# Patient Record
Sex: FEMALE | Race: WHITE | Employment: PART TIME | URBAN - NONMETROPOLITAN AREA
[De-identification: names, ages, dates, MRNs, and addresses within clinical notes are randomized per-mention and may not be internally consistent; named-entity substitution may affect disease eponyms.]

---

## 2017-03-16 ENCOUNTER — OFFICE VISIT (OUTPATIENT)
Dept: CARDIOLOGY CLINIC | Age: 47
End: 2017-03-16

## 2017-03-16 VITALS
DIASTOLIC BLOOD PRESSURE: 70 MMHG | HEIGHT: 66 IN | WEIGHT: 145 LBS | SYSTOLIC BLOOD PRESSURE: 122 MMHG | BODY MASS INDEX: 23.3 KG/M2 | HEART RATE: 76 BPM

## 2017-03-16 DIAGNOSIS — R00.0 TACHYCARDIA: Primary | ICD-10-CM

## 2017-03-16 DIAGNOSIS — R07.89 CHEST PRESSURE: ICD-10-CM

## 2017-03-16 PROCEDURE — 99214 OFFICE O/P EST MOD 30 MIN: CPT | Performed by: INTERNAL MEDICINE

## 2017-03-16 RX ORDER — GABAPENTIN 100 MG/1
100 CAPSULE ORAL 3 TIMES DAILY
COMMUNITY

## 2017-03-16 RX ORDER — MECLIZINE HYDROCHLORIDE 25 MG/1
25 TABLET ORAL 3 TIMES DAILY PRN
COMMUNITY

## 2017-05-03 ENCOUNTER — TELEPHONE (OUTPATIENT)
Dept: CARDIOLOGY CLINIC | Age: 47
End: 2017-05-03

## 2017-10-17 ENCOUNTER — OFFICE VISIT (OUTPATIENT)
Dept: CARDIOLOGY CLINIC | Age: 47
End: 2017-10-17

## 2017-10-17 VITALS
OXYGEN SATURATION: 98 % | WEIGHT: 143 LBS | DIASTOLIC BLOOD PRESSURE: 80 MMHG | SYSTOLIC BLOOD PRESSURE: 140 MMHG | BODY MASS INDEX: 22.98 KG/M2 | HEIGHT: 66 IN | HEART RATE: 87 BPM

## 2017-10-17 DIAGNOSIS — F10.20 UNCOMPLICATED ALCOHOL DEPENDENCE (HCC): ICD-10-CM

## 2017-10-17 DIAGNOSIS — R07.89 CHEST PRESSURE: ICD-10-CM

## 2017-10-17 DIAGNOSIS — R00.0 TACHYCARDIA: Primary | ICD-10-CM

## 2017-10-17 PROCEDURE — 99213 OFFICE O/P EST LOW 20 MIN: CPT | Performed by: INTERNAL MEDICINE

## 2017-10-17 PROCEDURE — 93000 ELECTROCARDIOGRAM COMPLETE: CPT | Performed by: INTERNAL MEDICINE

## 2017-10-17 RX ORDER — OMEPRAZOLE 40 MG/1
40 CAPSULE, DELAYED RELEASE ORAL DAILY
COMMUNITY

## 2017-10-17 RX ORDER — NADOLOL 20 MG/1
10 TABLET ORAL DAILY PRN
Qty: 30 TABLET | Refills: 5 | Status: SHIPPED | OUTPATIENT
Start: 2017-10-17

## 2017-10-17 NOTE — PATIENT INSTRUCTIONS
We have discussed Emma finding someone to talk with and support her when she is stressed.   She will do a plain GXT to evaluate recent chest pain  No medication changes warranted  Follow up in 6 months with Dr Rowley Aw

## 2017-10-17 NOTE — PROGRESS NOTES
strength, numbness or tingling. No change in gait, balance, coordination, mood, affect, memory, mentation, behavior. · Psychiatric: No anxiety, no depression. +Stress   · Endocrine: No malaise, fatigue or temperature intolerance. No excessive thirst, fluid intake, or urination. No tremor. · Hematologic/Lymphatic: No abnormal bruising or bleeding, blood clots or swollen lymph nodes. · Allergic/Immunologic: No nasal congestion or hives. Physical Examination:    Vitals:    10/17/17 1252   BP: 88/60   Pulse: 87   SpO2: 98%        · GENERAL: Well developed, well nourished, No acute distress, appears younger than stated age. · NEUROLOGICAL: Alert and oriented  · PSYCH: Calm affect  · SKIN: Warm and dry  · HEENT: Normocephalic, Sclera non-icteric, mucus membranes moist  · NECK: supple, JVP normal  · CAROTID: Normal upstroke, no bruits. · CARDIAC: Normal PMI, regular rate and rhythm, normal S1S2, no murmur, rub, or gallop  · RESPIRATORY: Normal respiratory effort, clear to auscultation bilaterally  · EXTREMITIES: No edema. · MUSCULOSKELETAL: No joint swelling or tenderness, no chest wall tenderness  · GASTROINTESTINAL: normal bowel sounds, soft, non-tender, no bruit      Assessment:   Problem: Tachycardia  Persistent, for 4 years, holter monitor showed average HR 83 bpm.  Rare PVC's and PAC's. TSH has been normal  Continues using nadolol sparingly d/t it lowering her BP and causing fatigue.-Hasn't used any in two months    Problem: Chest pressure  In the last week, nonexertional.  Repeat GXT     Problem: Wellness  Lipids 1/2016:  , HDL 74, LDL 60, TG 73  CRP and ESR normal      Plan: We have discussed Emma finding someone to talk with and support her when she is stressed.   She will do a plain GXT to evaluate recent chest pain  No medication changes warranted  Follow up in 6 months with Dr Rowley Aw       Thank you for allowing me to participate in the care of this individual.      Shelbie Ball M.D., [facility], FSCAI

## 2017-11-15 ENCOUNTER — TELEPHONE (OUTPATIENT)
Dept: CARDIOLOGY CLINIC | Age: 47
End: 2017-11-15

## 2017-11-15 DIAGNOSIS — R94.31 ABNORMAL ECG DURING EXERCISE STRESS TEST: ICD-10-CM

## 2017-11-15 DIAGNOSIS — R07.89 CHEST PRESSURE: Primary | ICD-10-CM

## 2017-11-15 NOTE — TELEPHONE ENCOUNTER
Pt returned my call. Discussed the abnormal stress test and recommendation by [facility] to have myoview stress in [facility] location. Was given the Central Scheduling number to call to arrange.

## 2017-11-15 NOTE — TELEPHONE ENCOUNTER
Reviewed gxt results and EKG with [facility]. Is abnormal. Stress myoview recommended . lmor as above asking pt to return call. Pt needs to have a stress myoview in [address] . Order placed.  Needs to call Central Scheduling [phone]

## 2018-01-22 ENCOUNTER — HOSPITAL ENCOUNTER (OUTPATIENT)
Dept: NON INVASIVE DIAGNOSTICS | Age: 48
Discharge: HOME | End: 2018-01-22
Attending: INTERNAL MEDICINE | Admitting: INTERNAL MEDICINE

## 2018-01-22 LAB
LV EF: 79 %
LVEF MODALITY: NORMAL

## 2018-01-24 ENCOUNTER — TELEPHONE (OUTPATIENT)
Dept: CARDIOLOGY CLINIC | Age: 48
End: 2018-01-24

## 2018-05-08 ENCOUNTER — OFFICE VISIT (OUTPATIENT)
Dept: CARDIOLOGY CLINIC | Age: 48
End: 2018-05-08

## 2018-05-08 VITALS
HEIGHT: 66 IN | WEIGHT: 149.6 LBS | HEART RATE: 82 BPM | SYSTOLIC BLOOD PRESSURE: 110 MMHG | DIASTOLIC BLOOD PRESSURE: 80 MMHG | BODY MASS INDEX: 24.04 KG/M2

## 2018-05-08 DIAGNOSIS — R00.2 PALPITATIONS: ICD-10-CM

## 2018-05-08 DIAGNOSIS — R00.2 HEART PALPITATIONS: ICD-10-CM

## 2018-05-08 DIAGNOSIS — R00.0 TACHYCARDIA: Primary | ICD-10-CM

## 2018-05-08 PROCEDURE — 99214 OFFICE O/P EST MOD 30 MIN: CPT | Performed by: INTERNAL MEDICINE

## 2024-01-29 PROBLEM — R06.02 SOB (SHORTNESS OF BREATH): Status: ACTIVE | Noted: 2024-01-29

## 2024-06-06 PROBLEM — R07.9 CHEST PAIN: Status: ACTIVE | Noted: 2024-06-06

## 2024-06-06 NOTE — PROGRESS NOTES
BPM. THE MINIMUM HEART RATE WAS 56 BPM, OCCURRING AT 4:52:52 AM. THE  MAXIMUM HEART RATE  BPM, OCCURRING AT 1:48:08 PM.     VENTRICULAR ECTOPIC ACTIVITY CONSISTED OF 2 BEATS, OF WHICH, 2 WERE IN SINGLE PVCs.     SUPRAVENTRICULAR ECTOPIC ACTIVITY CONSISTED OF 11 BEATS, OF WHICH, 4 WERE IN ATRIAL  COUPLETS, 7 WERE SINGLE PACs. THE LONGEST R-R INTERVAL WAS 1.1 SECONDS OCCURRING AT  10:54:38 PM D1. THE LONGEST N-N INTERVAL WAS 1.1 SECONDS OCCURRING AT 10:54:38 PM D1.    EP studies / cardioversions   No results found for this or any previous visit.    ADDITIONAL IMAGING:   CT Chest W Contrast: 5/27/24  Impression:     No significant abnormality on chest CT     LABS     Component 04/05/24 01/20/23 09/17/22 04/28/21 05/23/20 07/30/18   CHOLESTEROL 188 231 High  196 165 180 171   TRIGLYCERIDE 123  149  184 High   120  78  84    HDL CHOLESTEROL 54 64 58 64 51 73   LDL CHOLESTEROL (CALCULATED) 109  137 High   101  77  113  81    TOTAL NON HDL CHOL 134 High   167 High   138 High   101  129  98      Component 04/05/24 07/11/23 05/18/23 01/20/23 09/17/22 02/11/22   TSH 2.220  1.89  2.71  2.4  2.450  1.33        Component 04/05/24 06/02/23 05/27/23 05/18/23 03/02/23 02/09/23   WBC 8.4 7.9 7.6 7.7 18.4 High  9.5   RBC 3.87 4.01 4.25 4.26 3.89 4.12   HEMOGLOBIN 11.7 Low  11.9 Low  12.7 12.7 11.9 Low  12.6   HEMATOCRIT 34.6 Low  36.1 37.7 37.9 35.5 Low  37.8   MCV 89.5 90.1 88.7 89.0 91.3 91.6   MCH 30.1 29.7 29.8 29.8 30.7 30.6   MCHC 33.7 33.0 33.6 33.6 33.6 33.4   RDW 13.6 13.7 13.5 13.2 13.3 13.5   PLATELET 350 277 288 301 323 296   MPV 9.3 9.5  9.3 10.4 9.0 9.3   ABS. NEUTROPHIL 5.30 -- 4.70 4.80 15.90 High  6.50   ABS LYMPHS 2.30 -- 1.90 1.90 1.40 1.90   ABS MONOS 0.50 -- 0.70 0.50 1.00 High  0.70   ABS EOS 0.30 -- 0.30 0.30 0.00 Low  0.30   ABS BASOS 0.10 -- 0.10 0.10 0.00 0.10   SEGS 63 -- 61 63 87 69   LYMPHOCYTES 27 -- 25 25 8 20   MONOCYTES 6 -- 9 7 5 7   EOSINOPHIL 3 -- 4 4 0 3   BASOPHILS 1  -- 1  1  0  1    View

## 2024-06-12 ENCOUNTER — OFFICE VISIT (OUTPATIENT)
Dept: CARDIOLOGY CLINIC | Age: 54
End: 2024-06-12
Payer: COMMERCIAL

## 2024-06-12 VITALS
HEART RATE: 73 BPM | SYSTOLIC BLOOD PRESSURE: 124 MMHG | BODY MASS INDEX: 29.79 KG/M2 | HEIGHT: 65 IN | DIASTOLIC BLOOD PRESSURE: 86 MMHG | WEIGHT: 178.8 LBS | OXYGEN SATURATION: 99 %

## 2024-06-12 DIAGNOSIS — Z78.9 EXCESSIVE CAFFEINE INTAKE: ICD-10-CM

## 2024-06-12 DIAGNOSIS — R07.9 CHEST PAIN OF UNCERTAIN ETIOLOGY: ICD-10-CM

## 2024-06-12 DIAGNOSIS — D50.9 IRON DEFICIENCY ANEMIA, UNSPECIFIED IRON DEFICIENCY ANEMIA TYPE: ICD-10-CM

## 2024-06-12 DIAGNOSIS — R00.2 PALPITATIONS: Primary | ICD-10-CM

## 2024-06-12 DIAGNOSIS — R06.09 DOE (DYSPNEA ON EXERTION): ICD-10-CM

## 2024-06-12 PROCEDURE — 93000 ELECTROCARDIOGRAM COMPLETE: CPT | Performed by: INTERNAL MEDICINE

## 2024-06-12 PROCEDURE — 99204 OFFICE O/P NEW MOD 45 MIN: CPT | Performed by: INTERNAL MEDICINE

## 2024-06-12 RX ORDER — RIZATRIPTAN BENZOATE 10 MG/1
TABLET ORAL
COMMUNITY
Start: 2024-04-23

## 2024-06-12 NOTE — PATIENT INSTRUCTIONS
Cut back on energy drinks as this may be contributing to your palpitations    Cardiac event monitor placed in office today - will call you with results    Echocardiogram soon - will call you with results    Treadmill stress test soon - will call you with results    Follow up with Dr. Jacob pending results of tests

## 2024-07-01 ENCOUNTER — PATIENT MESSAGE (OUTPATIENT)
Dept: CARDIOLOGY CLINIC | Age: 54
End: 2024-07-01

## 2024-07-02 NOTE — TELEPHONE ENCOUNTER
From: Emma Hartman  To: Dr. Ramone Jacob  Sent: 7/1/2024 6:02 PM EDT  Subject: Heart Monitor     I had to take my heart monitor off earlier. It was making me red and itch. Was the results ok want you had?  Thanks  Emma

## 2024-07-08 ENCOUNTER — TELEPHONE (OUTPATIENT)
Dept: CARDIOLOGY CLINIC | Age: 54
End: 2024-07-08

## 2024-07-08 ENCOUNTER — PATIENT MESSAGE (OUTPATIENT)
Dept: CARDIOLOGY CLINIC | Age: 54
End: 2024-07-08

## 2024-07-08 RX ORDER — METOPROLOL SUCCINATE 25 MG/1
25 TABLET, EXTENDED RELEASE ORAL DAILY
Qty: 90 TABLET | Refills: 3 | Status: SHIPPED | OUTPATIENT
Start: 2024-07-08

## 2024-07-08 NOTE — TELEPHONE ENCOUNTER
Pt called back and relay [facility] message. Pt verbalized understanding. Pt is asking to send prescription to pharmacy.

## 2024-07-08 NOTE — TELEPHONE ENCOUNTER
----- Message from Ramone Jacob MD sent at 7/7/2024 10:12 PM EDT -----  No atrial fib but given her symptoms she might have some AF or sustained SVT causing symptoms  Since monitor had to be taken off early, if she's still having sx let's empirically treat her with metoprolol xl 25 daily and see how that improves how she feels

## 2024-07-09 NOTE — TELEPHONE ENCOUNTER
From: Emma Hartman  To: Dr. Ramone Jacob  Sent: 7/8/2024 3:44 PM EDT  Subject: Heart    Do I have anything to worry about? Can we try medication for it?  Thanks  Emma

## 2024-07-09 NOTE — TELEPHONE ENCOUNTER
Spoke to patient and she was not aware the medication was ordered until this morning. She is waiting on [facility] to fill it later today. Medication discussed. Advised pt to keep an eye on her symptoms when taking it and if she has lightheaded/ dizzy feeling she should call the office to let us know. Pt will monitor her BP as well. V/u and agreeable to plan.

## 2024-07-15 ENCOUNTER — PATIENT MESSAGE (OUTPATIENT)
Dept: CARDIOLOGY CLINIC | Age: 54
End: 2024-07-15

## 2024-07-15 NOTE — TELEPHONE ENCOUNTER
From: Emma Hartman  To: Dr. Ramone Jacob  Sent: 7/15/2024 2:12 PM EDT  Subject: Heart    The medication is working. I can breathe better. Some people ask me if I have to be on a blood thinner? I told them I will ask.   Thanks  Emma

## 2024-07-21 ENCOUNTER — PATIENT MESSAGE (OUTPATIENT)
Dept: CARDIOLOGY CLINIC | Age: 54
End: 2024-07-21

## 2024-07-22 NOTE — TELEPHONE ENCOUNTER
I called Emma to discuss.  No answer, voicemail left and informed that I would also send a [employer] message.

## 2024-07-22 NOTE — TELEPHONE ENCOUNTER
From: Emma Hartman  To: Dr. Ramone Jacob  Sent: 7/21/2024 3:23 PM EDT  Subject: Breathe     I was cutting grass got out of breathe bad. Had pressure on my chest. Had to quit. Just sat done an rest.   When I was cleaning I got out of breathe. I go to lung doc on July 25  Thanks   Emma

## 2024-07-25 ENCOUNTER — OFFICE VISIT (OUTPATIENT)
Dept: PULMONOLOGY | Age: 54
End: 2024-07-25
Payer: COMMERCIAL

## 2024-07-25 VITALS
HEART RATE: 72 BPM | OXYGEN SATURATION: 99 % | BODY MASS INDEX: 28.28 KG/M2 | DIASTOLIC BLOOD PRESSURE: 72 MMHG | SYSTOLIC BLOOD PRESSURE: 126 MMHG | HEIGHT: 66 IN | WEIGHT: 176 LBS

## 2024-07-25 DIAGNOSIS — R06.02 SOB (SHORTNESS OF BREATH): Primary | ICD-10-CM

## 2024-07-25 PROCEDURE — 99204 OFFICE O/P NEW MOD 45 MIN: CPT | Performed by: STUDENT IN AN ORGANIZED HEALTH CARE EDUCATION/TRAINING PROGRAM

## 2024-07-25 NOTE — PROGRESS NOTES
Pulmonary and Critical Care Medicine    Date: 7/25/2024  CC: SOB   Referring Provider: Ramone Jacob MD    HPI     HPI: Ms. Hartman is a 53 y.o. female with a PMH of Anxiety, depression, GERD, migraine who was referred to the pulmonary clinic to help work up SOB. Patient denies any hx of tobacco use, she has never had any pulmonary issues in the past, never used oxygen or inhalers. 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 She denies any cough, no wheezing, no waxing or waning symptom. She was evaluated by cardiology underwent stress test which was low risk, Echo was normal. She also was given a monitor which showed sinus tachycardia, she was started on metoprolol which helped a symptoms a little. She has gained 25 pounds in the past year as well. She had a CT chest done at [facility] Oct 2024 which was normal.     ROS: negative except stated above     PMH:  has a past medical history of Hypotension.   PSH:  has a past surgical history that includes Hysterectomy; Cholecystectomy; back surgery; Neck surgery (09/19/2017); and other surgical history (2023).    SH:  reports that she has never smoked. She has been exposed to tobacco smoke. She has never used smokeless tobacco. She reports that she does not drink alcohol and does not use drugs.   FH: family history includes Asthma in her mother and sister; Diabetes in her mother; Heart Defect in her maternal grandfather; High Blood Pressure in her father and mother; Stroke in her maternal grandfather.    Allergies: Morphine and Pcn [penicillins]   Work: works as a nursing assistant   Home: no mold   Pets: dog    OBJECTIVE DATA       PHYSICAL EXAM:   /72 (Site: Left Upper Arm, Position: Sitting, Cuff Size: Medium Adult)   Pulse 72   Ht 1.676 m (5' 6\")   Wt 79.8 kg (176 lb)   SpO2 99%   BMI 28.41 kg/m²      General:

## 2024-08-06 ENCOUNTER — CLINICAL DOCUMENTATION (OUTPATIENT)
Dept: ONCOLOGY | Age: 54
End: 2024-08-06

## 2024-08-06 DIAGNOSIS — D50.9 IRON DEFICIENCY ANEMIA, UNSPECIFIED IRON DEFICIENCY ANEMIA TYPE: Primary | ICD-10-CM

## 2024-08-06 RX ORDER — SODIUM CHLORIDE 0.9 % (FLUSH) 0.9 %
5-40 SYRINGE (ML) INJECTION PRN
OUTPATIENT
Start: 2024-08-06

## 2024-08-06 RX ORDER — SODIUM CHLORIDE 9 MG/ML
5-250 INJECTION, SOLUTION INTRAVENOUS PRN
OUTPATIENT
Start: 2024-08-06

## 2024-08-07 ENCOUNTER — CLINICAL DOCUMENTATION (OUTPATIENT)
Dept: ONCOLOGY | Age: 54
End: 2024-08-07

## 2024-08-07 ENCOUNTER — TELEPHONE (OUTPATIENT)
Dept: CARDIOLOGY CLINIC | Age: 54
End: 2024-08-07

## 2024-08-07 PROBLEM — D50.9 IRON DEFICIENCY ANEMIA, UNSPECIFIED: Status: ACTIVE | Noted: 2024-08-07

## 2024-08-07 NOTE — PROGRESS NOTES
Called to schedule iron infusion, HIPAA compliant message left to return call to infusion center.   
lr [phone]

## 2024-08-07 NOTE — TELEPHONE ENCOUNTER
Alia from the infusion center requesting diagnoses codes for infusion order  Please call and advise

## 2024-08-16 ENCOUNTER — HOSPITAL ENCOUNTER (OUTPATIENT)
Dept: ONCOLOGY | Age: 54
Setting detail: INFUSION SERIES
Discharge: HOME | End: 2024-08-16
Payer: COMMERCIAL

## 2024-08-16 VITALS
SYSTOLIC BLOOD PRESSURE: 112 MMHG | TEMPERATURE: 97.5 F | RESPIRATION RATE: 16 BRPM | DIASTOLIC BLOOD PRESSURE: 83 MMHG | HEART RATE: 87 BPM

## 2024-08-16 DIAGNOSIS — D50.9 IRON DEFICIENCY ANEMIA, UNSPECIFIED IRON DEFICIENCY ANEMIA TYPE: ICD-10-CM

## 2024-08-16 DIAGNOSIS — R06.02 SHORTNESS OF BREATH: Primary | ICD-10-CM

## 2024-08-16 PROCEDURE — 96365 THER/PROPH/DIAG IV INF INIT: CPT

## 2024-08-16 PROCEDURE — 6360000002 HC RX W HCPCS: Performed by: INTERNAL MEDICINE

## 2024-08-16 PROCEDURE — 99211 OFF/OP EST MAY X REQ PHY/QHP: CPT

## 2024-08-16 RX ORDER — SODIUM CHLORIDE 0.9 % (FLUSH) 0.9 %
5-40 SYRINGE (ML) INJECTION PRN
Status: CANCELLED | OUTPATIENT
Start: 2024-08-23

## 2024-08-16 RX ORDER — SODIUM CHLORIDE 9 MG/ML
5-250 INJECTION, SOLUTION INTRAVENOUS PRN
Status: CANCELLED | OUTPATIENT
Start: 2024-08-23

## 2024-08-16 RX ADMIN — Medication 200 MG: at 13:10

## 2024-08-20 ENCOUNTER — PATIENT MESSAGE (OUTPATIENT)
Dept: CARDIOLOGY CLINIC | Age: 54
End: 2024-08-20

## 2024-08-20 DIAGNOSIS — E86.0 DEHYDRATION: Primary | ICD-10-CM

## 2024-08-20 NOTE — TELEPHONE ENCOUNTER
I called and spoke with infusion center, they stated that it would be okay to do fluids also, just need an order faxed.

## 2024-08-21 RX ORDER — SODIUM CHLORIDE 9 MG/ML
INJECTION, SOLUTION INTRAVENOUS CONTINUOUS
Status: SHIPPED | OUTPATIENT
Start: 2024-08-23 | End: 2024-08-23

## 2024-08-21 NOTE — TELEPHONE ENCOUNTER
Order placed for NS 250ml/hr per verbal conversation with JAMIA. Spoke to Zena at Infusion center to advise and discuss. Order faxed to infusion center with success.

## 2024-08-23 ENCOUNTER — HOSPITAL ENCOUNTER (OUTPATIENT)
Dept: ONCOLOGY | Age: 54
Setting detail: INFUSION SERIES
Discharge: HOME | End: 2024-08-23
Payer: COMMERCIAL

## 2024-08-23 VITALS
DIASTOLIC BLOOD PRESSURE: 67 MMHG | TEMPERATURE: 97.3 F | SYSTOLIC BLOOD PRESSURE: 110 MMHG | HEART RATE: 81 BPM | RESPIRATION RATE: 16 BRPM

## 2024-08-23 DIAGNOSIS — R06.02 SHORTNESS OF BREATH: Primary | ICD-10-CM

## 2024-08-23 DIAGNOSIS — D50.9 IRON DEFICIENCY ANEMIA, UNSPECIFIED IRON DEFICIENCY ANEMIA TYPE: ICD-10-CM

## 2024-08-23 PROCEDURE — 96365 THER/PROPH/DIAG IV INF INIT: CPT

## 2024-08-23 PROCEDURE — 2580000003 HC RX 258: Performed by: INTERNAL MEDICINE

## 2024-08-23 PROCEDURE — 96360 HYDRATION IV INFUSION INIT: CPT

## 2024-08-23 PROCEDURE — 99211 OFF/OP EST MAY X REQ PHY/QHP: CPT

## 2024-08-23 PROCEDURE — 6360000002 HC RX W HCPCS: Performed by: INTERNAL MEDICINE

## 2024-08-23 RX ORDER — SODIUM CHLORIDE 9 MG/ML
INJECTION, SOLUTION INTRAVENOUS ONCE
Status: COMPLETED | OUTPATIENT
Start: 2024-08-23 | End: 2024-08-23

## 2024-08-23 RX ORDER — SODIUM CHLORIDE 9 MG/ML
5-250 INJECTION, SOLUTION INTRAVENOUS PRN
Status: CANCELLED | OUTPATIENT
Start: 2024-08-30

## 2024-08-23 RX ORDER — SODIUM CHLORIDE 0.9 % (FLUSH) 0.9 %
5-40 SYRINGE (ML) INJECTION PRN
Status: CANCELLED | OUTPATIENT
Start: 2024-08-30

## 2024-08-23 RX ADMIN — Medication 200 MG: at 13:00

## 2024-08-23 RX ADMIN — SODIUM CHLORIDE: 9 INJECTION, SOLUTION INTRAVENOUS at 12:59

## 2024-08-23 NOTE — PROGRESS NOTES
Patient ambulatory to department for second of five doses of venofer. Tolerated venofer infusion well with no adverse rx noted.Given avs with information about venofer. Verbally told about most common possible side effects. To return next week for next infusion.

## 2024-08-26 ENCOUNTER — HOSPITAL ENCOUNTER (OUTPATIENT)
Dept: PULMONOLOGY | Age: 54
Discharge: HOME | End: 2024-08-26
Attending: STUDENT IN AN ORGANIZED HEALTH CARE EDUCATION/TRAINING PROGRAM
Payer: COMMERCIAL

## 2024-08-26 ENCOUNTER — HOSPITAL ENCOUNTER (OUTPATIENT)
Age: 54
Discharge: HOME | End: 2024-08-26
Attending: STUDENT IN AN ORGANIZED HEALTH CARE EDUCATION/TRAINING PROGRAM
Payer: COMMERCIAL

## 2024-08-26 ENCOUNTER — PATIENT MESSAGE (OUTPATIENT)
Dept: CARDIOLOGY CLINIC | Age: 54
End: 2024-08-26

## 2024-08-26 VITALS — OXYGEN SATURATION: 98 % | RESPIRATION RATE: 16 BRPM | HEART RATE: 87 BPM

## 2024-08-26 DIAGNOSIS — R06.09 DOE (DYSPNEA ON EXERTION): ICD-10-CM

## 2024-08-26 DIAGNOSIS — R60.1 GENERALIZED EDEMA: ICD-10-CM

## 2024-08-26 DIAGNOSIS — R06.02 SOB (SHORTNESS OF BREATH): ICD-10-CM

## 2024-08-26 LAB
ANION GAP SERPL CALCULATED.3IONS-SCNC: 13 MMOL/L (ref 3–16)
BUN SERPL-MCNC: 16 MG/DL (ref 7–20)
CALCIUM SERPL-MCNC: 9.3 MG/DL (ref 8.3–10.6)
CHLORIDE SERPL-SCNC: 106 MMOL/L (ref 99–110)
CO2 SERPL-SCNC: 23 MMOL/L (ref 21–32)
CREAT SERPL-MCNC: 0.6 MG/DL (ref 0.6–1.1)
DLCO %PRED: 64 %
DLCO PRED: NORMAL
DLCO/VA %PRED: NORMAL
DLCO/VA PRED: NORMAL
DLCO/VA: NORMAL
DLCO: NORMAL
EXPIRATORY TIME-POST: NORMAL
EXPIRATORY TIME: NORMAL
FEF 25-75 %CHNG: NORMAL
FEF 25-75 POST %PRED: NORMAL
FEF 25-75% %PRED-PRE: NORMAL
FEF 25-75% PRED: NORMAL
FEF 25-75-POST: NORMAL
FEF 25-75-PRE: NORMAL
FEV1 %PRED-POST: NORMAL
FEV1 %PRED-PRE: 94 %
FEV1 PRED: NORMAL
FEV1-POST: NORMAL
FEV1-PRE: NORMAL
FEV1/FVC %PRED-POST: NORMAL
FEV1/FVC %PRED-PRE: NORMAL
FEV1/FVC PRED: NORMAL
FEV1/FVC-POST: NORMAL
FEV1/FVC-PRE: 77 %
FVC %PRED-POST: NORMAL
FVC %PRED-PRE: NORMAL
FVC PRED: NORMAL
FVC-POST: NORMAL
FVC-PRE: NORMAL
GAW %PRED: NORMAL
GAW PRED: NORMAL
GAW: NORMAL
GFR SERPLBLD CREATININE-BSD FMLA CKD-EPI: >90 ML/MIN/{1.73_M2}
GLUCOSE SERPL-MCNC: 82 MG/DL (ref 70–99)
IC PRE %PRED: NORMAL
IC PRED: NORMAL
IC: NORMAL
MEP: NORMAL
MIP: NORMAL
MVV %PRED-PRE: NORMAL
MVV PRED: NORMAL
MVV-PRE: NORMAL
NT-PROBNP SERPL-MCNC: 205 PG/ML (ref 0–124)
PEF %PRED-POST: NORMAL
PEF %PRED-PRE: NORMAL
PEF PRED: NORMAL
PEF%CHNG: NORMAL
PEF-POST: NORMAL
PEF-PRE: NORMAL
POTASSIUM SERPL-SCNC: 3.9 MMOL/L (ref 3.5–5.1)
RAW %PRED: NORMAL
RAW PRED: NORMAL
RAW: NORMAL
RV PRE %PRED: NORMAL
RV PRED: NORMAL
RV: NORMAL
SODIUM SERPL-SCNC: 142 MMOL/L (ref 136–145)
SVC %PRED: NORMAL
SVC PRED: NORMAL
SVC: NORMAL
TLC PRE %PRED: 103 %
TLC PRED: NORMAL
TLC: NORMAL
VA %PRED: NORMAL
VA PRED: NORMAL
VA: NORMAL
VTG %PRED: NORMAL
VTG PRED: NORMAL
VTG: NORMAL

## 2024-08-26 PROCEDURE — 94760 N-INVAS EAR/PLS OXIMETRY 1: CPT

## 2024-08-26 PROCEDURE — 94726 PLETHYSMOGRAPHY LUNG VOLUMES: CPT

## 2024-08-26 PROCEDURE — 94729 DIFFUSING CAPACITY: CPT

## 2024-08-26 PROCEDURE — 94010 BREATHING CAPACITY TEST: CPT

## 2024-08-26 PROCEDURE — 80048 BASIC METABOLIC PNL TOTAL CA: CPT

## 2024-08-26 PROCEDURE — 83880 ASSAY OF NATRIURETIC PEPTIDE: CPT

## 2024-08-26 PROCEDURE — 36415 COLL VENOUS BLD VENIPUNCTURE: CPT

## 2024-08-26 RX ORDER — ALBUTEROL SULFATE 90 UG/1
4 AEROSOL, METERED RESPIRATORY (INHALATION) ONCE
Status: CANCELLED | OUTPATIENT
Start: 2024-08-26

## 2024-08-26 ASSESSMENT — PULMONARY FUNCTION TESTS
FEV1_PERCENT_PREDICTED_PRE: 94
FEV1/FVC_PRE: 77

## 2024-08-27 ENCOUNTER — TELEPHONE (OUTPATIENT)
Dept: CARDIOLOGY CLINIC | Age: 54
End: 2024-08-27

## 2024-08-27 PROCEDURE — 94726 PLETHYSMOGRAPHY LUNG VOLUMES: CPT | Performed by: INTERNAL MEDICINE

## 2024-08-27 PROCEDURE — 94729 DIFFUSING CAPACITY: CPT | Performed by: INTERNAL MEDICINE

## 2024-08-27 PROCEDURE — 94010 BREATHING CAPACITY TEST: CPT | Performed by: INTERNAL MEDICINE

## 2024-08-27 NOTE — TELEPHONE ENCOUNTER
Called patient and discussed her concerns. She has 2 pillow orthopnea for about 5 months, SOB with stairs \"like someone is pushing against her chest\", About a month ago she was told by a nurse at work that her ankles look like she was retaining fluid. Pt states that she \"feels a little worse\" since her last appt with [facility] 6/12. Pt had a PFT today but does not know her results as of yet.

## 2024-08-27 NOTE — PROCEDURES
Pulmonary Function Testing      Patient name:  Emma Hartman      Unit #:   [id number]   Date of test: 8/26/2024  Date of interpretation:   8/27/2024    Ms. Emma Hartman is a 53 y.o. year-old non smoker. The spirometry data were acceptable and reproducible.     Spirometry:  Flow volume loops were normal. The FEV-1/FVC ratio was normal. The   prebronchodilator  FEV-1 was 2.64 liters (94% of predicted), which was normal. The FVC was 3.45 liters (96% of predicted), which was normal. Response to inhaled bronchodilators (albuterol) was not performed.    Lung volumes:  Lung volumes were tested by plethysmography. The total lung capacity was 5.31 liters (103% of predicted), which was normal. The residual volume was 1.78 liters (95% of predicted), which was normal. The ratio of residual volume to total lung capacity (RV/TLC) was 93, which was normal. Specific airway resistance was normal.    Diffusion capacity was found to be decreased, 64%.       Interpretation:  Decreased diffusion capacity.  Normal spirometry lung volumes.  Differentials include early interstitial lung disease, pulmonary hypertension, anemia or emphysema.  Clinical correlation recommended.      Dolly Park MD  [facility]   [address], [address], [place] [address]

## 2024-08-28 RX ORDER — SPIRONOLACTONE 25 MG/1
25 TABLET ORAL DAILY
Qty: 90 TABLET | Refills: 3 | Status: SHIPPED | OUTPATIENT
Start: 2024-08-28

## 2024-08-28 NOTE — TELEPHONE ENCOUNTER
Called patient and advised on medication administration. V/u and no further questions at this time.

## 2024-08-30 ENCOUNTER — HOSPITAL ENCOUNTER (OUTPATIENT)
Dept: ONCOLOGY | Age: 54
Setting detail: INFUSION SERIES
Discharge: HOME | End: 2024-08-30
Payer: COMMERCIAL

## 2024-08-30 VITALS
DIASTOLIC BLOOD PRESSURE: 79 MMHG | RESPIRATION RATE: 16 BRPM | SYSTOLIC BLOOD PRESSURE: 116 MMHG | HEART RATE: 89 BPM | TEMPERATURE: 97.4 F

## 2024-08-30 DIAGNOSIS — R06.02 SHORTNESS OF BREATH: Primary | ICD-10-CM

## 2024-08-30 DIAGNOSIS — D50.9 IRON DEFICIENCY ANEMIA, UNSPECIFIED IRON DEFICIENCY ANEMIA TYPE: ICD-10-CM

## 2024-08-30 DIAGNOSIS — R94.2 DIFFUSION CAPACITY OF LUNG (DL), DECREASED: Primary | ICD-10-CM

## 2024-08-30 DIAGNOSIS — R06.02 SOB (SHORTNESS OF BREATH): ICD-10-CM

## 2024-08-30 PROCEDURE — 6360000002 HC RX W HCPCS: Performed by: INTERNAL MEDICINE

## 2024-08-30 PROCEDURE — 99211 OFF/OP EST MAY X REQ PHY/QHP: CPT

## 2024-08-30 PROCEDURE — 96365 THER/PROPH/DIAG IV INF INIT: CPT

## 2024-08-30 PROCEDURE — 2580000003 HC RX 258: Performed by: INTERNAL MEDICINE

## 2024-08-30 RX ORDER — SODIUM CHLORIDE 9 MG/ML
5-250 INJECTION, SOLUTION INTRAVENOUS PRN
Status: DISCONTINUED | OUTPATIENT
Start: 2024-08-30 | End: 2024-08-31 | Stop reason: HOSPADM

## 2024-08-30 RX ORDER — SODIUM CHLORIDE 0.9 % (FLUSH) 0.9 %
5-40 SYRINGE (ML) INJECTION PRN
Status: DISCONTINUED | OUTPATIENT
Start: 2024-08-30 | End: 2024-08-31 | Stop reason: HOSPADM

## 2024-08-30 RX ORDER — SODIUM CHLORIDE 9 MG/ML
5-250 INJECTION, SOLUTION INTRAVENOUS PRN
OUTPATIENT
Start: 2024-09-06

## 2024-08-30 RX ORDER — SODIUM CHLORIDE 0.9 % (FLUSH) 0.9 %
5-40 SYRINGE (ML) INJECTION PRN
OUTPATIENT
Start: 2024-09-06

## 2024-08-30 RX ADMIN — Medication 200 MG: at 12:54

## 2024-08-30 RX ADMIN — SODIUM CHLORIDE 100 ML/HR: 9 INJECTION, SOLUTION INTRAVENOUS at 12:53

## 2024-08-30 RX ADMIN — Medication 10 ML: at 12:54

## 2024-08-30 NOTE — PROGRESS NOTES
Patient ambulatory to department for third of five doses of venofer. Tolerated venofer infusion well with no adverse rx noted.Given avs with information about venofer. Verbally told about most common possible side effects. To return next week for next infusion.

## 2024-08-30 NOTE — PROGRESS NOTES
PFT   Pre-bronch FVC 3.45L, 96%  Pre-Bronch FEV1 2.64L, 94%  FEV1/FVC 77  TLC 5.31L, 103%  RV 1.78L, 95%,  DLCO 15.0 , 64%    Plan:   Patient with SOB  PFT shows Low DLCO, echo no pHTN  For completion will get HRCT to evaluate for ILD / fibrosis     Fahad Fernandes MD   Pulmonary and Critical Care Medicine  [facility]

## 2024-09-03 ENCOUNTER — PATIENT MESSAGE (OUTPATIENT)
Dept: CARDIOLOGY CLINIC | Age: 54
End: 2024-09-03

## 2024-09-04 ENCOUNTER — CLINICAL DOCUMENTATION (OUTPATIENT)
Dept: PULMONOLOGY | Age: 54
End: 2024-09-04

## 2024-09-04 NOTE — PROGRESS NOTES
PFT   Pre-bronch FVC 3.45L, 96%  Pre-Bronch FEV1 2.64L, 94%  FEV1/FVC 77  TLC 5.31L, 103%  RV 1.78L, 95%,  DLCO 15.0, 64%    Assessment:  Normal PFT except mild reduction in diffusion capacity    Plan:  TTE no pHTN  HRCT pending     Discussed with patient.     Fahad Fernandes MD   Pulmonary and Critical Care Medicine  [facility]

## 2024-09-04 NOTE — PROGRESS NOTES
ibuprofen (ADVIL;MOTRIN) 200 MG tablet Take 1 tablet by mouth every 6 hours as needed for Pain   Yes Provider, MD Stefani   aspirin 325 MG tablet Take 1 tablet by mouth as needed   Yes Provider, MD Stefani     PHYSICAL EXAM        Vitals:    09/06/24 0918   BP: 92/62   Pulse: 76   SpO2: 97%      Weight - Scale: 79.4 kg (175 lb)     Gen Alert, cooperative, no distress Heart  Regular rate and rhythm, no murmur.  Normal S1 and S2.  Warm/well perfused.  No JVD.    HEENT Normocephalic, atraumatic.  Conj/corn clear  Lips, mucosa, tongue normal Abd  Soft, nontender, nondistended, no organomegaly   Neck Supple, trachea midline Ext  Ext nl, AT, no C/C, no edema   Lungs Lungs clear to auscultation bilaterally, unlabored breathing Pulse 2+ and symmetric   Chest wall No deformity Skin Color/text/turg nl, no rash/lesions   Neuro No gross deficits Psych Nl mood and affect     LABS and Imaging     Relevant and available CV data reviewed    CARDIAC IMAGING/TESTING:  EKG today 9/6/24: personally interpreted: NSR, heart rate 66    ECHO (TTE) COMPLETE (PRN CONTRAST/BUBBLE/STRAIN/3D) 06/13/2024 11:11 AM (Final)    Interpretation Summary    Left Ventricle: Normal left ventricular systolic function with a visually estimated EF of 60 - 65%. Left ventricle size is normal. Normal wall thickness. Normal wall motion. Normal diastolic function. Average E/e' ratio is 8.50.    Right Ventricle: Normal systolic function. TAPSE is normal. TAPSE is 1.9 cm. RV Peak S' is 10 cm/s.    Aortic Valve: Trileaflet valve.    Mitral Valve: Trace regurgitation.    Tricuspid Valve: Mild regurgitation. The estimated RVSP is 24 mmHg.    Image quality is adequate.    Signed by: Ramone Jacob MD on 6/13/2024 11:11 AM      Echo: 2/11/2015  Indication: Tachycardia  Summary:         06/13/24    STRESS TEST ONLY EXERCISE 06/13/2024 11:25 AM (Final)    Interpretation Summary    ECG: The stress ECG was negative for ischemia.    Stress Test: Blood pressure

## 2024-09-05 ENCOUNTER — HOSPITAL ENCOUNTER (OUTPATIENT)
Dept: ONCOLOGY | Age: 54
Setting detail: INFUSION SERIES
Discharge: HOME | End: 2024-09-05
Payer: COMMERCIAL

## 2024-09-05 ENCOUNTER — HOSPITAL ENCOUNTER (OUTPATIENT)
Dept: CT IMAGING | Age: 54
Discharge: HOME | End: 2024-09-05
Attending: STUDENT IN AN ORGANIZED HEALTH CARE EDUCATION/TRAINING PROGRAM
Payer: COMMERCIAL

## 2024-09-05 VITALS
HEART RATE: 78 BPM | TEMPERATURE: 96.9 F | DIASTOLIC BLOOD PRESSURE: 69 MMHG | SYSTOLIC BLOOD PRESSURE: 121 MMHG | RESPIRATION RATE: 16 BRPM

## 2024-09-05 DIAGNOSIS — R06.02 SHORTNESS OF BREATH: Primary | ICD-10-CM

## 2024-09-05 DIAGNOSIS — R06.02 SOB (SHORTNESS OF BREATH): ICD-10-CM

## 2024-09-05 DIAGNOSIS — R94.2 DIFFUSION CAPACITY OF LUNG (DL), DECREASED: ICD-10-CM

## 2024-09-05 DIAGNOSIS — D50.9 IRON DEFICIENCY ANEMIA, UNSPECIFIED IRON DEFICIENCY ANEMIA TYPE: ICD-10-CM

## 2024-09-05 PROCEDURE — 99211 OFF/OP EST MAY X REQ PHY/QHP: CPT

## 2024-09-05 PROCEDURE — 6360000002 HC RX W HCPCS: Performed by: INTERNAL MEDICINE

## 2024-09-05 PROCEDURE — 96365 THER/PROPH/DIAG IV INF INIT: CPT

## 2024-09-05 PROCEDURE — 71250 CT THORAX DX C-: CPT

## 2024-09-05 PROCEDURE — 2580000003 HC RX 258: Performed by: INTERNAL MEDICINE

## 2024-09-05 RX ORDER — SODIUM CHLORIDE 9 MG/ML
5-250 INJECTION, SOLUTION INTRAVENOUS PRN
Status: CANCELLED | OUTPATIENT
Start: 2024-09-06

## 2024-09-05 RX ORDER — SODIUM CHLORIDE 0.9 % (FLUSH) 0.9 %
5-40 SYRINGE (ML) INJECTION PRN
Status: CANCELLED | OUTPATIENT
Start: 2024-09-06

## 2024-09-05 RX ORDER — SODIUM CHLORIDE 9 MG/ML
5-250 INJECTION, SOLUTION INTRAVENOUS PRN
Status: DISCONTINUED | OUTPATIENT
Start: 2024-09-05 | End: 2024-09-06 | Stop reason: HOSPADM

## 2024-09-05 RX ORDER — SODIUM CHLORIDE 0.9 % (FLUSH) 0.9 %
5-40 SYRINGE (ML) INJECTION PRN
Status: DISCONTINUED | OUTPATIENT
Start: 2024-09-05 | End: 2024-09-06 | Stop reason: HOSPADM

## 2024-09-05 RX ADMIN — Medication 200 MG: at 12:05

## 2024-09-05 RX ADMIN — SODIUM CHLORIDE 400 ML/HR: 9 INJECTION, SOLUTION INTRAVENOUS at 12:04

## 2024-09-05 RX ADMIN — SODIUM CHLORIDE, PRESERVATIVE FREE 10 ML: 5 INJECTION INTRAVENOUS at 12:03

## 2024-09-05 NOTE — PROGRESS NOTES
Patient ambulatory to department for fourth of five doses of venofer. Tolerated venofer infusion well with no adverse rx noted.Denied need for printed AVS. To return next week for next infusion.

## 2024-09-06 ENCOUNTER — PATIENT MESSAGE (OUTPATIENT)
Dept: CARDIOLOGY CLINIC | Age: 54
End: 2024-09-06

## 2024-09-06 ENCOUNTER — OFFICE VISIT (OUTPATIENT)
Dept: CARDIOLOGY CLINIC | Age: 54
End: 2024-09-06
Payer: COMMERCIAL

## 2024-09-06 VITALS
SYSTOLIC BLOOD PRESSURE: 92 MMHG | OXYGEN SATURATION: 97 % | HEIGHT: 65 IN | DIASTOLIC BLOOD PRESSURE: 62 MMHG | BODY MASS INDEX: 29.16 KG/M2 | WEIGHT: 175 LBS | HEART RATE: 76 BPM

## 2024-09-06 DIAGNOSIS — R07.89 CHEST PRESSURE: Primary | ICD-10-CM

## 2024-09-06 DIAGNOSIS — I50.32 CHRONIC DIASTOLIC HEART FAILURE (HCC): ICD-10-CM

## 2024-09-06 DIAGNOSIS — R00.2 PALPITATIONS: Primary | ICD-10-CM

## 2024-09-06 DIAGNOSIS — R42 DIZZINESS: ICD-10-CM

## 2024-09-06 DIAGNOSIS — R07.9 CHEST PAIN, UNSPECIFIED TYPE: ICD-10-CM

## 2024-09-06 DIAGNOSIS — R00.2 HEART PALPITATIONS: ICD-10-CM

## 2024-09-06 DIAGNOSIS — I47.19 ATRIAL TACHYCARDIA (HCC): ICD-10-CM

## 2024-09-06 PROCEDURE — 93000 ELECTROCARDIOGRAM COMPLETE: CPT | Performed by: INTERNAL MEDICINE

## 2024-09-06 PROCEDURE — 99214 OFFICE O/P EST MOD 30 MIN: CPT | Performed by: INTERNAL MEDICINE

## 2024-09-06 RX ORDER — METOPROLOL SUCCINATE 25 MG/1
37.5 TABLET, EXTENDED RELEASE ORAL DAILY
Qty: 135 TABLET | Refills: 3 | Status: SHIPPED | OUTPATIENT
Start: 2024-09-06

## 2024-09-13 ENCOUNTER — HOSPITAL ENCOUNTER (OUTPATIENT)
Dept: ONCOLOGY | Age: 54
Setting detail: INFUSION SERIES
Discharge: HOME | End: 2024-09-13
Payer: COMMERCIAL

## 2024-09-13 VITALS
TEMPERATURE: 97.2 F | DIASTOLIC BLOOD PRESSURE: 85 MMHG | SYSTOLIC BLOOD PRESSURE: 116 MMHG | HEART RATE: 78 BPM | RESPIRATION RATE: 16 BRPM

## 2024-09-13 DIAGNOSIS — D50.9 IRON DEFICIENCY ANEMIA, UNSPECIFIED IRON DEFICIENCY ANEMIA TYPE: ICD-10-CM

## 2024-09-13 DIAGNOSIS — R06.02 SHORTNESS OF BREATH: Primary | ICD-10-CM

## 2024-09-13 PROCEDURE — 2580000003 HC RX 258: Performed by: INTERNAL MEDICINE

## 2024-09-13 PROCEDURE — 96365 THER/PROPH/DIAG IV INF INIT: CPT

## 2024-09-13 PROCEDURE — 99211 OFF/OP EST MAY X REQ PHY/QHP: CPT

## 2024-09-13 PROCEDURE — 6360000002 HC RX W HCPCS: Performed by: INTERNAL MEDICINE

## 2024-09-13 RX ORDER — SODIUM CHLORIDE 9 MG/ML
5-250 INJECTION, SOLUTION INTRAVENOUS PRN
OUTPATIENT
Start: 2024-09-13

## 2024-09-13 RX ORDER — SODIUM CHLORIDE 0.9 % (FLUSH) 0.9 %
5-40 SYRINGE (ML) INJECTION PRN
Status: DISCONTINUED | OUTPATIENT
Start: 2024-09-13 | End: 2024-09-13

## 2024-09-13 RX ORDER — SODIUM CHLORIDE 0.9 % (FLUSH) 0.9 %
5-40 SYRINGE (ML) INJECTION PRN
OUTPATIENT
Start: 2024-09-13

## 2024-09-13 RX ORDER — SODIUM CHLORIDE 9 MG/ML
5-250 INJECTION, SOLUTION INTRAVENOUS PRN
Status: DISCONTINUED | OUTPATIENT
Start: 2024-09-13 | End: 2024-09-13

## 2024-09-13 RX ADMIN — Medication 200 MG: at 12:55

## 2024-09-13 RX ADMIN — SODIUM CHLORIDE 125 ML/HR: 9 INJECTION, SOLUTION INTRAVENOUS at 12:53

## 2024-09-13 RX ADMIN — SODIUM CHLORIDE, PRESERVATIVE FREE 10 ML: 5 INJECTION INTRAVENOUS at 12:51

## 2024-09-18 ENCOUNTER — PATIENT MESSAGE (OUTPATIENT)
Dept: CARDIOLOGY CLINIC | Age: 54
End: 2024-09-18

## 2024-09-19 DIAGNOSIS — R94.2 ABNORMAL PFT: ICD-10-CM

## 2024-09-19 DIAGNOSIS — R06.02 SOB (SHORTNESS OF BREATH): Primary | ICD-10-CM

## 2024-09-27 ENCOUNTER — TELEPHONE (OUTPATIENT)
Dept: CARDIOLOGY CLINIC | Age: 54
End: 2024-09-27

## 2024-09-27 ENCOUNTER — HOSPITAL ENCOUNTER (OUTPATIENT)
Age: 54
Discharge: HOME | End: 2024-09-29
Attending: INTERNAL MEDICINE
Payer: COMMERCIAL

## 2024-09-27 ENCOUNTER — ANCILLARY PROCEDURE (OUTPATIENT)
Dept: CARDIOLOGY CLINIC | Age: 54
End: 2024-09-27

## 2024-09-27 ENCOUNTER — PATIENT MESSAGE (OUTPATIENT)
Dept: CARDIOLOGY CLINIC | Age: 54
End: 2024-09-27

## 2024-09-27 VITALS
HEIGHT: 65 IN | WEIGHT: 175 LBS | BODY MASS INDEX: 29.16 KG/M2 | HEART RATE: 82 BPM | SYSTOLIC BLOOD PRESSURE: 123 MMHG | DIASTOLIC BLOOD PRESSURE: 73 MMHG

## 2024-09-27 DIAGNOSIS — R07.9 CHEST PAIN, UNSPECIFIED TYPE: Primary | ICD-10-CM

## 2024-09-27 DIAGNOSIS — R00.2 HEART PALPITATIONS: ICD-10-CM

## 2024-09-27 DIAGNOSIS — R42 DIZZINESS: ICD-10-CM

## 2024-09-27 DIAGNOSIS — R00.2 PALPITATIONS: ICD-10-CM

## 2024-09-27 DIAGNOSIS — R07.89 CHEST PRESSURE: ICD-10-CM

## 2024-09-27 DIAGNOSIS — R07.9 CHEST PAIN, UNSPECIFIED TYPE: ICD-10-CM

## 2024-09-27 LAB
ECHO BSA: 1.91 M2
ECHO BSA: 1.91 M2
NUC STRESS EJECTION FRACTION: 69 %
NUC STRESS LV EDV: 52 ML (ref 56–104)
NUC STRESS LV ESV: 16 ML (ref 19–49)
NUC STRESS LV MASS: 95 G
STRESS BASELINE DIAS BP: 73 MMHG
STRESS BASELINE HR: 78 BPM
STRESS BASELINE SYS BP: 123 MMHG
STRESS ESTIMATED WORKLOAD: 7 METS
STRESS EXERCISE DUR MIN: 5 MIN
STRESS EXERCISE DUR SEC: 0 SEC
STRESS O2 SAT PEAK: 96 %
STRESS O2 SAT REST: 99 %
STRESS PEAK DIAS BP: 58 MMHG
STRESS PEAK SYS BP: 163 MMHG
STRESS PERCENT HR ACHIEVED: 95 %
STRESS POST PEAK HR: 157 BPM
STRESS RATE PRESSURE PRODUCT: ABNORMAL BPM*MMHG
STRESS TARGET HR: 166 BPM
TID: 0.97

## 2024-09-27 PROCEDURE — A9500 TC99M SESTAMIBI: HCPCS | Performed by: INTERNAL MEDICINE

## 2024-09-27 PROCEDURE — 93018 CV STRESS TEST I&R ONLY: CPT | Performed by: INTERNAL MEDICINE

## 2024-09-27 PROCEDURE — 78452 HT MUSCLE IMAGE SPECT MULT: CPT

## 2024-09-27 PROCEDURE — 3430000000 HC RX DIAGNOSTIC RADIOPHARMACEUTICAL: Performed by: INTERNAL MEDICINE

## 2024-09-27 PROCEDURE — 93016 CV STRESS TEST SUPVJ ONLY: CPT | Performed by: INTERNAL MEDICINE

## 2024-09-27 PROCEDURE — 78452 HT MUSCLE IMAGE SPECT MULT: CPT | Performed by: INTERNAL MEDICINE

## 2024-09-27 PROCEDURE — 93017 CV STRESS TEST TRACING ONLY: CPT

## 2024-09-27 RX ORDER — TETRAKIS(2-METHOXYISOBUTYLISOCYANIDE)COPPER(I) TETRAFLUOROBORATE 1 MG/ML
33.1 INJECTION, POWDER, LYOPHILIZED, FOR SOLUTION INTRAVENOUS
Status: COMPLETED | OUTPATIENT
Start: 2024-09-27 | End: 2024-09-27

## 2024-09-27 RX ORDER — TETRAKIS(2-METHOXYISOBUTYLISOCYANIDE)COPPER(I) TETRAFLUOROBORATE 1 MG/ML
10.3 INJECTION, POWDER, LYOPHILIZED, FOR SOLUTION INTRAVENOUS
Status: COMPLETED | OUTPATIENT
Start: 2024-09-27 | End: 2024-09-27

## 2024-09-27 RX ADMIN — Medication 10.3 MILLICURIE: at 08:07

## 2024-09-27 RX ADMIN — Medication 33.1 MILLICURIE: at 09:25

## 2024-10-08 ENCOUNTER — PATIENT MESSAGE (OUTPATIENT)
Dept: CARDIOLOGY CLINIC | Age: 54
End: 2024-10-08

## 2024-10-17 ENCOUNTER — PATIENT MESSAGE (OUTPATIENT)
Dept: CARDIOLOGY CLINIC | Age: 54
End: 2024-10-17

## 2024-10-17 ENCOUNTER — TELEPHONE (OUTPATIENT)
Dept: CARDIOLOGY CLINIC | Age: 54
End: 2024-10-17

## 2024-10-17 LAB — ECHO BSA: 1.91 M2

## 2024-10-17 NOTE — TELEPHONE ENCOUNTER
Attempted to call pt, na/lvm.     IF PT RETURNS CALL AND IS WILLING TO SCHEDULE WITH LEONILA TODAY, PLEASE SCHEDULE HER . THANK YOU!

## 2024-10-17 NOTE — TELEPHONE ENCOUNTER
Spoke to patient and scheduled with SP Peña after speaking to BERONICA Benavidez and reviewing monitor results. Pt is scheduled for 9am 10/18 in . Pt instructed on how to find the office in the hospital. No further questions at this time.

## 2024-10-18 ENCOUNTER — OFFICE VISIT (OUTPATIENT)
Dept: CARDIOLOGY CLINIC | Age: 54
End: 2024-10-18

## 2024-10-18 VITALS
BODY MASS INDEX: 29.32 KG/M2 | DIASTOLIC BLOOD PRESSURE: 64 MMHG | SYSTOLIC BLOOD PRESSURE: 122 MMHG | WEIGHT: 176 LBS | HEART RATE: 74 BPM | OXYGEN SATURATION: 99 % | HEIGHT: 65 IN

## 2024-10-18 DIAGNOSIS — R00.2 PALPITATIONS: Primary | ICD-10-CM

## 2024-10-18 NOTE — PROGRESS NOTES
[facility]   Electrophysiology  Office Visit   Date: 10/25/2024  Attending: Edd Jacob MD    Chief Complaint:   Chief Complaint   Patient presents with    New Patient     Palpitations, SOB.     History of Present Illness: History obtained from patient and medical record.    Emma Hartman is 53 y.o. female with a past medical history of anxiety, depression, dizziness, endometriosis, esophageal reflux, cervical herniated disc, migraine with aura and without status migrainosus not intractable, pharyngoesophageal dysphagia, iron deficiency anemia, chronic bilateral thoracic back pain, HFpEF tachycardia.      Interval history: Today, Emma Hartman is being seen for follow up regarding Holter monitor results. She complains of palpitations, shortness of breath and dizziness. Symptoms occur daily, no identifiable aggravating or relieving factors. 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 Discussed that no arrhythmias were found of recent event monitor, echo with preserved EF, stress test without evidence of ischemia.     Denies having chest pain, palpitations, shortness of breath, orthopnea/PND, cough, or dizziness at the time of this visit.    With regard to medication therapy the patient has been compliant with prescribed regimen. They have tolerated therapy to date.     Assessment and Plan.    Palpitations  - Episodes occur daily. No identifiable triggers or relieving factors  - Recent monitor results reviewed with patient: sinus tachycardia, short runs of PAT and 0.26% PCV burden noted. No arrhythmia seen  - Recent stress test without evidence of ischemia    - Echo with preserved EF   - Reports some improvement on higher dose of Toprol XL 50mg daily  - BP is borderline limiting increasing beta blocker   - Encourage her to avoid use of energy drinks, increase water intake prior to and during ling shifts at hospital     2. HFpEF  - Compensated   - EF 60-65%  - Continue spirolactone 25, Toprol

## 2024-10-21 ENCOUNTER — PATIENT MESSAGE (OUTPATIENT)
Dept: CARDIOLOGY CLINIC | Age: 54
End: 2024-10-21

## 2024-10-23 RX ORDER — METOPROLOL SUCCINATE 50 MG/1
50 TABLET, EXTENDED RELEASE ORAL DAILY
Qty: 90 TABLET | Refills: 3 | Status: SHIPPED | OUTPATIENT
Start: 2024-10-23

## 2024-11-06 ENCOUNTER — TELEPHONE (OUTPATIENT)
Dept: CARDIOLOGY CLINIC | Age: 54
End: 2024-11-06

## 2024-11-06 NOTE — TELEPHONE ENCOUNTER
[facility] is asking to push thru electronically pt's 6/13/24 echo. Any questions please call

## 2024-11-08 NOTE — PATIENT INSTRUCTIONS
Increase metoprolol to 1.5 tablets (37.5mg) at bedtime.    HOLD metoprolol the night before your stress test.    Call the office for any new, worsening, or concerning symptoms.     4 = No assist / stand by assistance